# Patient Record
Sex: FEMALE | Race: WHITE | Employment: UNEMPLOYED | URBAN - METROPOLITAN AREA
[De-identification: names, ages, dates, MRNs, and addresses within clinical notes are randomized per-mention and may not be internally consistent; named-entity substitution may affect disease eponyms.]

---

## 2020-01-31 ENCOUNTER — OFFICE VISIT (OUTPATIENT)
Dept: FAMILY MEDICINE CLINIC | Facility: CLINIC | Age: 60
End: 2020-01-31
Payer: COMMERCIAL

## 2020-01-31 VITALS
RESPIRATION RATE: 16 BRPM | TEMPERATURE: 99 F | OXYGEN SATURATION: 98 % | HEART RATE: 55 BPM | SYSTOLIC BLOOD PRESSURE: 110 MMHG | DIASTOLIC BLOOD PRESSURE: 62 MMHG

## 2020-01-31 DIAGNOSIS — J34.89 FRONTAL SINUS PAIN: ICD-10-CM

## 2020-01-31 DIAGNOSIS — R30.0 DYSURIA: Primary | ICD-10-CM

## 2020-01-31 LAB
BILIRUBIN: NEGATIVE
GLUCOSE (URINE DIPSTICK): 100 MG/DL
KETONES (URINE DIPSTICK): NEGATIVE MG/DL
MULTISTIX LOT#: ABNORMAL NUMERIC
NITRITE, URINE: POSITIVE
PH, URINE: 7.5 (ref 4.5–8)
PROTEIN (URINE DIPSTICK): NEGATIVE MG/DL
SPECIFIC GRAVITY: 1.01 (ref 1–1.03)
UROBILINOGEN,SEMI-QN: 1 MG/DL (ref 0–1.9)

## 2020-01-31 PROCEDURE — 87086 URINE CULTURE/COLONY COUNT: CPT | Performed by: NURSE PRACTITIONER

## 2020-01-31 PROCEDURE — 87186 SC STD MICRODIL/AGAR DIL: CPT | Performed by: NURSE PRACTITIONER

## 2020-01-31 PROCEDURE — 81003 URINALYSIS AUTO W/O SCOPE: CPT | Performed by: NURSE PRACTITIONER

## 2020-01-31 PROCEDURE — 87088 URINE BACTERIA CULTURE: CPT | Performed by: NURSE PRACTITIONER

## 2020-01-31 PROCEDURE — 99202 OFFICE O/P NEW SF 15 MIN: CPT | Performed by: NURSE PRACTITIONER

## 2020-01-31 RX ORDER — CEFDINIR 300 MG/1
300 CAPSULE ORAL 2 TIMES DAILY
Qty: 14 CAPSULE | Refills: 0 | Status: SHIPPED | OUTPATIENT
Start: 2020-01-31 | End: 2020-02-07

## 2020-01-31 RX ORDER — LEVOTHYROXINE SODIUM 88 UG/1
TABLET ORAL
COMMUNITY
Start: 2019-09-12

## 2020-01-31 NOTE — PROGRESS NOTES
CHIEF COMPLAINT:   Patient presents with:  UTI: frquency, burning, urgency, using AZO helped. Sinus Problem: X 1 week, yellow, frontal pressure, aching.  Has tried sinus spray      HPI:   Chelsey Mejia is a 60year old female who presents with symptom /62   Pulse 55   Temp 98.5 °F (36.9 °C) (Oral)   Resp 16   LMP 10/09/2010   SpO2 98%   Breastfeeding No   GENERAL: well developed, well nourished,in no apparent distress  SKIN: no rashes,no suspicious lesions  HEAD: atraumatic, normocephalic, +  tend • cefdinir 300 MG Oral Cap 14 capsule 0     Sig: Take 1 capsule (300 mg total) by mouth 2 (two) times daily for 7 days. Will treat for UTI with cefdinir as directed. Discussed potential cross-reactivity with PCN with patient albeit low percentage.   PT ? Wipe from front to back after you go to the bathroom. ? Gently wash the area around your vagina when you bathe or shower. ? Wear cotton underwear. ? Use pantyhose with cotton crotches. ? Avoid tight clothing. Wear loose pants.    ? Do not wear a w When to call your healthcare provider  Call the healthcare provider right away if you have any of the following:  · Fever of 100.4°F (38°C) or higher   · No improvement after three days of treatment  · Trouble urinating because of pain  · New or increased · You can use an over-the-counter decongestant, unless a similar medicine was prescribed to you. Nasal sprays work the fastest. Use one that contains phenylephrine or oxymetazoline. First blow your nose gently. Then use the spray.  Do not use these medicine · Don’t have close contact with people who have sore throats, colds, or other upper respiratory infections. · Don’t smoke, and stay away from secondhand smoke. · Stay up to date with of your vaccines.   Date Last Reviewed: 11/1/2017  © 5379-2806 [employer] · dizziness  · dry mouth  · headache  · loss of appetite  · nausea, vomiting  · stomach pain  · stool discoloration  · tiredness  · vaginal discharge, itching, or odor in women  What may interact with this medicine?   · antacids that contain aluminum or mag

## 2020-01-31 NOTE — PATIENT INSTRUCTIONS
· Complete full course of antibiotics. · Over the counter Tylenol (acetaminophen) or Advil/Motrin (ibuprofen) can be taken according to package instructions as needed for pain/discomfort. · Follow up in 2-3 days if symptoms do not improve or worsen.     · exam)  Some parts of the urinalysis may be done in the provider's office and some in a lab. And, the urine sample may be checked for bacteria and yeast (urine culture). Your healthcare provider will tell you more about these tests if they are needed.   How stop taking them, even when you feel better. · Drink plenty of water, hot tea, and other liquids. This may help thin nasal mucus. It also may help your sinuses drain fluids. · Heat may help soothe painful areas of your face.  Use a towel soaked in hot pratibha advice  Call your healthcare provider if any of these occur:  · Facial pain or headache that gets worse  · Stiff neck  · Unusual drowsiness or confusion  · Swelling of your forehead or eyelids  · Vision problems, such as blurred or double vision  · Fever o hives, swelling of the face, lips, or tongue  · bloody or watery diarrhea  · breathing problems  · fever  · redness, blistering, peeling or loosening of the skin, including inside the mouth  · seizures  · trouble passing urine or change in the amount of ur medicine. NOTE:This sheet is a summary. It may not cover all possible information. If you have questions about this medicine, talk to your doctor, pharmacist, or health care provider.  Copyright© 2019 [employer]